# Patient Record
Sex: FEMALE | Race: WHITE | Employment: UNEMPLOYED | URBAN - NONMETROPOLITAN AREA
[De-identification: names, ages, dates, MRNs, and addresses within clinical notes are randomized per-mention and may not be internally consistent; named-entity substitution may affect disease eponyms.]

---

## 2017-03-15 RX ORDER — ENALAPRIL MALEATE 10 MG/1
TABLET ORAL
Qty: 60 TABLET | Refills: 0 | Status: SHIPPED | OUTPATIENT
Start: 2017-03-15 | End: 2017-03-23 | Stop reason: ALTCHOICE

## 2017-03-16 LAB
ANION GAP SERPL CALCULATED.3IONS-SCNC: 15 MMOL/L
BUN BLDV-MCNC: 13 MG/DL (ref 10–20)
CALCIUM SERPL-MCNC: 9.2 MG/DL (ref 8.7–10.8)
CHLORIDE BLD-SCNC: 106 MMOL/L (ref 95–111)
CHOLESTEROL/HDL RATIO: 3.8
CHOLESTEROL: 132 MG/DL
CO2: 22 MMOL/L (ref 21–32)
CREAT SERPL-MCNC: 1 MG/DL (ref 0.5–1.3)
EGFR AFRICAN AMERICAN: 69
EGFR IF NONAFRICAN AMERICAN: 57
GLUCOSE: 121 MG/DL (ref 70–100)
HDLC SERPL-MCNC: 35 MG/DL (ref 40–60)
LDL CHOLESTEROL CALCULATED: 57 MG/DL
LDL/HDL RATIO: 1.6
POTASSIUM SERPL-SCNC: 3.9 MMOL/L (ref 3.5–5.4)
PROTEIN 24 HOUR URINE: 191 MG/24HR (ref 50–100)
PROTEIN, URINE: 9 MG/DL
SODIUM BLD-SCNC: 139 MMOL/L (ref 134–147)
TRIGL SERPL-MCNC: 202 MG/DL
VLDLC SERPL CALC-MCNC: 40 MG/DL
VOLUME: 2125 ML (ref 500–3500)

## 2017-03-23 ENCOUNTER — OFFICE VISIT (OUTPATIENT)
Dept: NEPHROLOGY | Age: 57
End: 2017-03-23

## 2017-03-23 VITALS
WEIGHT: 223 LBS | RESPIRATION RATE: 18 BRPM | HEART RATE: 64 BPM | DIASTOLIC BLOOD PRESSURE: 82 MMHG | SYSTOLIC BLOOD PRESSURE: 140 MMHG

## 2017-03-23 DIAGNOSIS — N02.8 IGA NEPHROPATHY: ICD-10-CM

## 2017-03-23 DIAGNOSIS — E78.5 DYSLIPIDEMIA: ICD-10-CM

## 2017-03-23 DIAGNOSIS — I10 ESSENTIAL HYPERTENSION: ICD-10-CM

## 2017-03-23 DIAGNOSIS — R80.9 PROTEINURIA: Primary | ICD-10-CM

## 2017-03-23 PROCEDURE — 3017F COLORECTAL CA SCREEN DOC REV: CPT | Performed by: INTERNAL MEDICINE

## 2017-03-23 PROCEDURE — G8484 FLU IMMUNIZE NO ADMIN: HCPCS | Performed by: INTERNAL MEDICINE

## 2017-03-23 PROCEDURE — 1036F TOBACCO NON-USER: CPT | Performed by: INTERNAL MEDICINE

## 2017-03-23 PROCEDURE — 99214 OFFICE O/P EST MOD 30 MIN: CPT | Performed by: INTERNAL MEDICINE

## 2017-03-23 PROCEDURE — 3014F SCREEN MAMMO DOC REV: CPT | Performed by: INTERNAL MEDICINE

## 2017-03-23 PROCEDURE — G8427 DOCREV CUR MEDS BY ELIG CLIN: HCPCS | Performed by: INTERNAL MEDICINE

## 2017-03-23 PROCEDURE — G8421 BMI NOT CALCULATED: HCPCS | Performed by: INTERNAL MEDICINE

## 2017-03-23 RX ORDER — SIMVASTATIN 10 MG
10 TABLET ORAL EVERY EVENING
Qty: 90 TABLET | Refills: 3 | Status: SHIPPED | OUTPATIENT
Start: 2017-03-23 | End: 2018-04-16 | Stop reason: SDUPTHER

## 2017-03-23 RX ORDER — AMLODIPINE BESYLATE 10 MG/1
10 TABLET ORAL DAILY
COMMUNITY

## 2017-03-23 RX ORDER — SIMVASTATIN 20 MG
20 TABLET ORAL NIGHTLY
Qty: 90 TABLET | Refills: 3 | Status: SHIPPED | OUTPATIENT
Start: 2017-03-23 | End: 2017-03-23 | Stop reason: CLARIF

## 2017-03-23 RX ORDER — ENALAPRIL MALEATE 10 MG/1
10 TABLET ORAL
Qty: 180 TABLET | Refills: 3 | Status: SHIPPED | OUTPATIENT
Start: 2017-03-23 | End: 2018-04-16 | Stop reason: SDUPTHER

## 2018-03-15 LAB
ALBUMIN SERPL-MCNC: 4.6 G/DL (ref 3.5–5.2)
ALK PHOSPHATASE: 92 U/L (ref 30–121)
ALT SERPL-CCNC: 38 U/L (ref 5–40)
ANION GAP SERPL CALCULATED.3IONS-SCNC: 14 MEQ/L (ref 10–19)
AST SERPL-CCNC: 31 U/L (ref 9–40)
BILIRUB SERPL-MCNC: 0.7 MG/DL
BILIRUBIN DIRECT: <0.2 MG/DL
BUN BLDV-MCNC: 16 MG/DL (ref 8–23)
CALCIUM SERPL-MCNC: 9.4 MG/DL (ref 8.5–10.5)
CHLORIDE BLD-SCNC: 105 MEQ/L (ref 95–107)
CHOLESTEROL/HDL RATIO: 4.6
CHOLESTEROL: 174 MG/DL
CO2: 24 MEQ/L (ref 19–31)
CREAT SERPL-MCNC: 1 MG/DL (ref 0.6–1.3)
EGFR AFRICAN AMERICAN: 72.4 ML/MIN/1.73 M2
EGFR IF NONAFRICAN AMERICAN: 62.5 ML/MIN/1.73 M2
GLUCOSE: 120 MG/DL (ref 70–99)
HDLC SERPL-MCNC: 38 MG/DL
LDL CHOLESTEROL CALCULATED: 92 MG/DL
LDL/HDL RATIO: 2.4
POTASSIUM SERPL-SCNC: 4.2 MEQ/L (ref 3.5–5.4)
PROTEIN 24 HOUR URINE: 169 MG/24HR (ref 0–150)
PROTEIN, URINE: 9 MG/DL
SODIUM BLD-SCNC: 143 MEQ/L (ref 135–146)
TOTAL PROTEIN: 7.4 G/DL (ref 6.1–8.3)
TRIGL SERPL-MCNC: 218 MG/DL
VLDLC SERPL CALC-MCNC: 44 MG/DL
VOLUME: 1875 ML

## 2018-03-22 ENCOUNTER — OFFICE VISIT (OUTPATIENT)
Dept: NEPHROLOGY | Age: 58
End: 2018-03-22
Payer: MEDICARE

## 2018-03-22 VITALS
SYSTOLIC BLOOD PRESSURE: 122 MMHG | WEIGHT: 228.2 LBS | RESPIRATION RATE: 18 BRPM | HEART RATE: 64 BPM | DIASTOLIC BLOOD PRESSURE: 76 MMHG

## 2018-03-22 DIAGNOSIS — R80.0 ISOLATED PROTEINURIA WITHOUT SPECIFIC MORPHOLOGIC LESION: Primary | ICD-10-CM

## 2018-03-22 DIAGNOSIS — N02.8 IGA NEPHROPATHY: ICD-10-CM

## 2018-03-22 DIAGNOSIS — I10 ESSENTIAL HYPERTENSION: ICD-10-CM

## 2018-03-22 DIAGNOSIS — E78.5 DYSLIPIDEMIA: ICD-10-CM

## 2018-03-22 PROCEDURE — 1036F TOBACCO NON-USER: CPT | Performed by: INTERNAL MEDICINE

## 2018-03-22 PROCEDURE — G8421 BMI NOT CALCULATED: HCPCS | Performed by: INTERNAL MEDICINE

## 2018-03-22 PROCEDURE — G8484 FLU IMMUNIZE NO ADMIN: HCPCS | Performed by: INTERNAL MEDICINE

## 2018-03-22 PROCEDURE — 3017F COLORECTAL CA SCREEN DOC REV: CPT | Performed by: INTERNAL MEDICINE

## 2018-03-22 PROCEDURE — G8427 DOCREV CUR MEDS BY ELIG CLIN: HCPCS | Performed by: INTERNAL MEDICINE

## 2018-03-22 PROCEDURE — 3014F SCREEN MAMMO DOC REV: CPT | Performed by: INTERNAL MEDICINE

## 2018-03-22 PROCEDURE — 99213 OFFICE O/P EST LOW 20 MIN: CPT | Performed by: INTERNAL MEDICINE

## 2018-03-22 NOTE — PROGRESS NOTES
tablet by mouth every evening 90 tablet 3    risperiDONE (RISPERDAL) 2 MG tablet       sertraline (ZOLOFT) 100 MG tablet Take 100 mg by mouth daily.  benztropine (COGENTIN) 1 MG tablet Take 1 mg by mouth daily.  fluvoxamine (LUVOX) 100 MG tablet Take 100 mg by mouth nightly. No current facility-administered medications for this visit. Lab Results:    CBC: No results found for: WBC, HGB, HCT, MCV, PLT  BMP:    Lab Results   Component Value Date     03/14/2018     03/15/2017     03/16/2016    K 4.2 03/14/2018    K 3.9 03/15/2017    K 3.9 03/16/2016     03/14/2018     03/15/2017     03/16/2016    CO2 24 03/14/2018    CO2 22 03/15/2017    CO2 24 03/16/2016    BUN 16 03/14/2018    BUN 13 03/15/2017    BUN 14 03/16/2016    CREATININE 1.0 03/14/2018    CREATININE 1.0 03/15/2017    CREATININE 0.9 03/16/2016    GLUCOSE 120 (H) 03/14/2018    GLUCOSE 121 (H) 03/15/2017    GLUCOSE 104 (H) 03/16/2016      Hepatic:   Lab Results   Component Value Date    AST 31 03/14/2018    ALT 38 03/14/2018    BILITOT 0.7 03/14/2018    ALKPHOS 92 03/14/2018     BNP: No results found for: BNP  Lipids:   Lab Results   Component Value Date    CHOL 174 03/14/2018    HDL 38 (L) 03/14/2018     INR: No results found for: INR  URINE:   Lab Results   Component Value Date    PROTUR 9 03/14/2018     No results found for: NITRU, COLORU, PHUR, LABCAST, WBCUA, RBCUA, MUCUS, TRICHOMONAS, YEAST, BACTERIA, CLARITYU, SPECGRAV, LEUKOCYTESUR, UROBILINOGEN, BILIRUBINUR, BLOODU, GLUCOSEU, KETUA, AMORPHOUS   Microalbumen/Creatinine ratio:  No components found for: RUCREAT    Objective:   Vitals: /76   Pulse 64   Resp 18   Wt 228 lb 3.2 oz (103.5 kg)      Constitutional:  Alert, awake, no apparent distress  Skin:normal  HEENT:Pupils are reactive . Throat is clear  Neck:supple with no thyromegally  Cardiovascular:  S1, S2 without m/r/g  Respiratory:  CTA B without w/r/r  Abdomen: +bs, soft, non

## 2018-04-16 RX ORDER — ENALAPRIL MALEATE 10 MG/1
10 TABLET ORAL
Qty: 180 TABLET | Refills: 3 | Status: SHIPPED | OUTPATIENT
Start: 2018-04-16 | End: 2019-03-21 | Stop reason: SDUPTHER

## 2018-04-16 RX ORDER — SIMVASTATIN 10 MG
10 TABLET ORAL EVERY EVENING
Qty: 90 TABLET | Refills: 3 | Status: SHIPPED | OUTPATIENT
Start: 2018-04-16 | End: 2019-04-16 | Stop reason: SDUPTHER

## 2019-03-14 LAB
ALBUMIN SERPL-MCNC: 4.6 G/DL (ref 3.5–5.2)
ALK PHOSPHATASE: 91 U/L (ref 30–121)
ALT SERPL-CCNC: 33 U/L (ref 5–40)
ANION GAP SERPL CALCULATED.3IONS-SCNC: 13 MEQ/L (ref 10–19)
AST SERPL-CCNC: 29 U/L (ref 9–40)
BILIRUB SERPL-MCNC: 0.7 MG/DL
BILIRUBIN DIRECT: <0.2 MG/DL
BUN BLDV-MCNC: 16 MG/DL (ref 8–23)
CALCIUM SERPL-MCNC: 9.3 MG/DL (ref 8.5–10.5)
CHLORIDE BLD-SCNC: 106 MEQ/L (ref 95–107)
CO2: 24 MEQ/L (ref 19–31)
CREAT SERPL-MCNC: 1 MG/DL (ref 0.6–1.3)
EGFR AFRICAN AMERICAN: 71.9 ML/MIN/1.73 M2
EGFR IF NONAFRICAN AMERICAN: 62.1 ML/MIN/1.73 M2
GLUCOSE: 116 MG/DL (ref 70–99)
POTASSIUM SERPL-SCNC: 4.1 MEQ/L (ref 3.5–5.4)
PROTEIN 24 HOUR URINE: 300 MG/24HR (ref 0–150)
PROTEIN, URINE: 10 MG/DL
SODIUM BLD-SCNC: 143 MEQ/L (ref 135–146)
TOTAL PROTEIN: 7.3 G/DL (ref 6.1–8.3)
VOLUME: 3000 ML

## 2019-03-21 ENCOUNTER — OFFICE VISIT (OUTPATIENT)
Dept: NEPHROLOGY | Age: 59
End: 2019-03-21
Payer: MEDICARE

## 2019-03-21 VITALS
WEIGHT: 219.2 LBS | DIASTOLIC BLOOD PRESSURE: 79 MMHG | SYSTOLIC BLOOD PRESSURE: 124 MMHG | HEART RATE: 79 BPM | OXYGEN SATURATION: 100 %

## 2019-03-21 DIAGNOSIS — R80.0 ISOLATED PROTEINURIA WITHOUT SPECIFIC MORPHOLOGIC LESION: Primary | ICD-10-CM

## 2019-03-21 DIAGNOSIS — N02.8 IGA NEPHROPATHY: ICD-10-CM

## 2019-03-21 DIAGNOSIS — E78.5 DYSLIPIDEMIA: ICD-10-CM

## 2019-03-21 DIAGNOSIS — I10 ESSENTIAL HYPERTENSION: ICD-10-CM

## 2019-03-21 PROCEDURE — G8421 BMI NOT CALCULATED: HCPCS | Performed by: INTERNAL MEDICINE

## 2019-03-21 PROCEDURE — G8484 FLU IMMUNIZE NO ADMIN: HCPCS | Performed by: INTERNAL MEDICINE

## 2019-03-21 PROCEDURE — G8427 DOCREV CUR MEDS BY ELIG CLIN: HCPCS | Performed by: INTERNAL MEDICINE

## 2019-03-21 PROCEDURE — 3017F COLORECTAL CA SCREEN DOC REV: CPT | Performed by: INTERNAL MEDICINE

## 2019-03-21 PROCEDURE — 99214 OFFICE O/P EST MOD 30 MIN: CPT | Performed by: INTERNAL MEDICINE

## 2019-03-21 PROCEDURE — 1036F TOBACCO NON-USER: CPT | Performed by: INTERNAL MEDICINE

## 2019-03-21 RX ORDER — ENALAPRIL MALEATE 10 MG/1
10 TABLET ORAL
Qty: 180 TABLET | Refills: 3 | Status: SHIPPED | OUTPATIENT
Start: 2019-03-21 | End: 2020-04-03 | Stop reason: SDUPTHER

## 2019-04-16 RX ORDER — SIMVASTATIN 10 MG
TABLET ORAL
Qty: 90 TABLET | Refills: 3 | Status: SHIPPED | OUTPATIENT
Start: 2019-04-16 | End: 2020-04-09 | Stop reason: SDUPTHER

## 2020-03-25 PROBLEM — E78.5 HLD (HYPERLIPIDEMIA): Status: RESOLVED | Noted: 2020-03-25 | Resolved: 2020-03-24

## 2020-04-03 RX ORDER — ENALAPRIL MALEATE 10 MG/1
10 TABLET ORAL
Qty: 180 TABLET | Refills: 3 | Status: SHIPPED | OUTPATIENT
Start: 2020-04-03

## 2020-04-09 RX ORDER — SIMVASTATIN 10 MG
TABLET ORAL
Qty: 90 TABLET | Refills: 3 | Status: SHIPPED | OUTPATIENT
Start: 2020-04-09 | End: 2021-04-12 | Stop reason: SDUPTHER

## 2020-09-17 LAB
ANION GAP SERPL CALCULATED.3IONS-SCNC: 11 MMOL/L (ref 5–15)
BUN BLDV-MCNC: 17 MG/DL (ref 5–23)
CALCIUM SERPL-MCNC: 9 MG/DL (ref 8.5–10.5)
CHLORIDE BLD-SCNC: 105 MMOL/L (ref 98–109)
CHOLESTEROL/HDL RATIO: 3.7 (ref 1–5)
CHOLESTEROL: 142 MG/DL (ref 150–200)
CO2: 25 MMOL/L (ref 22–32)
CREAT SERPL-MCNC: 0.98 MG/DL (ref 0.4–1)
EGFR AFRICAN AMERICAN: >60 ML/MIN/1.73SQ.M
EGFR IF NONAFRICAN AMERICAN: 58 ML/MIN/1.73SQ.M
GLUCOSE: 97 MG/DL (ref 65–99)
HDLC SERPL-MCNC: 38 MG/DL
LDL CHOLESTEROL CALCULATED: 74 MG/DL
LDL/HDL RATIO: 1.9
POTASSIUM SERPL-SCNC: 3.7 MMOL/L (ref 3.5–5)
PROTEIN 24 HOUR URINE: 148 MG/24H (ref 0–150)
PROTEIN, URINE: 50 MG/L
SODIUM BLD-SCNC: 141 MMOL/L (ref 134–146)
TRIGL SERPL-MCNC: 150 MG/DL (ref 27–150)
VLDLC SERPL CALC-MCNC: 30 MG/DL (ref 0–30)
VOLUME: 2950 ML

## 2020-09-24 ENCOUNTER — OFFICE VISIT (OUTPATIENT)
Dept: NEPHROLOGY | Age: 60
End: 2020-09-24
Payer: MEDICARE

## 2020-09-24 VITALS
DIASTOLIC BLOOD PRESSURE: 74 MMHG | HEART RATE: 69 BPM | TEMPERATURE: 97.7 F | WEIGHT: 225.8 LBS | SYSTOLIC BLOOD PRESSURE: 119 MMHG | OXYGEN SATURATION: 97 %

## 2020-09-24 PROCEDURE — G8427 DOCREV CUR MEDS BY ELIG CLIN: HCPCS | Performed by: INTERNAL MEDICINE

## 2020-09-24 PROCEDURE — 1036F TOBACCO NON-USER: CPT | Performed by: INTERNAL MEDICINE

## 2020-09-24 PROCEDURE — 99214 OFFICE O/P EST MOD 30 MIN: CPT | Performed by: INTERNAL MEDICINE

## 2020-09-24 PROCEDURE — 3017F COLORECTAL CA SCREEN DOC REV: CPT | Performed by: INTERNAL MEDICINE

## 2020-09-24 PROCEDURE — G8421 BMI NOT CALCULATED: HCPCS | Performed by: INTERNAL MEDICINE

## 2020-09-24 RX ORDER — RISPERIDONE 0.5 MG/1
0.5 TABLET, FILM COATED ORAL EVERY EVENING
COMMUNITY

## 2020-09-24 RX ORDER — RISPERIDONE 1 MG/1
1 TABLET, FILM COATED ORAL NIGHTLY
COMMUNITY

## 2020-09-24 RX ORDER — PREDNISONE 10 MG/1
TABLET ORAL
COMMUNITY
Start: 2020-09-21 | End: 2022-03-08

## 2020-09-24 NOTE — PROGRESS NOTES
Renal Progress Note    Assessment and Plan:      Diagnosis Orders   1. Isolated proteinuria without specific morphologic lesion     2. Dyslipidemia     3. IgA nephropathy     4. Essential hypertension       PLAN:  I discussed my thoughts at length with the patient. She understood. Lab results discussed with her in detail. I addressed her questions. Serum creatinine is good at 0.9 mg/L. Serum cholesterol is improved from  142 from 174  Triglyceride is improved from 218-to 150. 24-hour urine is improved to 148 from 300 mg. Medications reviewed. No changes. Return visit in 12 months with labs. Patient Active Problem List   Diagnosis    HTN (hypertension)    Hematuria    Proteinuria    Chronic kidney disease    Microalbuminuria    Exogenous obesity    IgA nephropathy    Dyslipidemia       Subjective:   Chief complaint:  Chief Complaint   Patient presents with    Proteinuria    Chronic Kidney Disease     Stage III      HPI:This is a follow up visit for Ms. Peyton Monteiro who is here today for return appointment. I see her for IgA nephropathy and chronic kidney disease. She was last seen in March 2019.  24 urine protein was 300 mg. Today it is 148 mg. Serum creatinine was 1.3 mg/dL. Today it is 0.98 mg/L. Cholesterol was 174. Today it is 142. Doing well with no complaint. She denies any chest pain or shortness of breath. No nausea or vomiting. No fever or chills. No headaches. No diarrhea. She recently contracted  poison ivy and is on prednisone now. It is for 7 days.     ROS:Constitutional: negative  Eyes: negative  Ears, nose, mouth, throat, and face: negative  Respiratory: negative  Cardiovascular: negative  Gastrointestinal: negative  Genitourinary:negative  Integument/breast: negative  Hematologic/lymphatic: negative  Musculoskeletal:negative  Neurological: negative  Behavioral/Psych: negative  Endocrine: negative  Allergic/Immunologic: negative  Medications:     Current Outpatient Medications   Medication Sig Dispense Refill    risperiDONE (RISPERDAL) 0.5 MG tablet Take 0.5 mg by mouth every evening      predniSONE (DELTASONE) 10 MG tablet       risperiDONE (RISPERDAL) 1 MG tablet Take 1 mg by mouth nightly       simvastatin (ZOCOR) 10 MG tablet TAKE ONE (1) TABLET BY MOUTH EVERY DAY IN THE EVENING. 90 tablet 3    enalapril (VASOTEC) 10 MG tablet Take 1 tablet by mouth 2 times daily (before meals) 180 tablet 3    amLODIPine (NORVASC) 10 MG tablet Take 10 mg by mouth daily      sertraline (ZOLOFT) 100 MG tablet Take 100 mg by mouth daily.  benztropine (COGENTIN) 1 MG tablet Take 1 mg by mouth daily.  fluvoxamine (LUVOX) 100 MG tablet Take 100 mg by mouth nightly. No current facility-administered medications for this visit.         Lab Results:    CBC: No results found for: WBC, HGB, HCT, MCV, PLT  BMP:    Lab Results   Component Value Date     09/16/2020     03/13/2019     03/14/2018    K 3.7 09/16/2020    K 4.1 03/13/2019    K 4.2 03/14/2018     09/16/2020     03/13/2019     03/14/2018    CO2 25 09/16/2020    CO2 24 03/13/2019    CO2 24 03/14/2018    BUN 17 09/16/2020    BUN 16 03/13/2019    BUN 16 03/14/2018    CREATININE 0.98 09/16/2020    CREATININE 1.0 03/13/2019    CREATININE 1.0 03/14/2018    GLUCOSE 97 09/16/2020    GLUCOSE 116 (H) 03/13/2019    GLUCOSE 120 (H) 03/14/2018      Hepatic:   Lab Results   Component Value Date    AST 29 03/13/2019    AST 31 03/14/2018    ALT 33 03/13/2019    ALT 38 03/14/2018    BILITOT 0.7 03/13/2019    BILITOT 0.7 03/14/2018    ALKPHOS 91 03/13/2019    ALKPHOS 92 03/14/2018     BNP: No results found for: BNP  Lipids:   Lab Results   Component Value Date    CHOL 142 (L) 09/16/2020    HDL 38 (L) 09/16/2020     INR: No results found for: INR  URINE:   Lab Results   Component Value Date    PROTUR 50 09/16/2020     No results found for: Bg [place], COLORU, PHUR, LABCAST, WBCUA, RBCUA, MUCUS, TRICHOMONAS, YEAST, BACTERIA, CLARITYU, SPECGRAV, LEUKOCYTESUR, UROBILINOGEN, BILIRUBINUR, BLOODU, GLUCOSEU, KETUA, AMORPHOUS   Microalbumen/Creatinine ratio:  No components found for: RUCREAT    Objective:   Vitals: /74 (Site: Right Upper Arm, Position: Sitting, Cuff Size: Large Adult)   Pulse 69   Temp 97.7 °F (36.5 °C)   Wt 225 lb 12.8 oz (102.4 kg)   SpO2 97%      Constitutional:  Alert, awake, no apparent distress  Skin:normal with no rash or any lesions  HEENT:Pupils are reactive . Throat is clear. Oral mucosa is moist.  Neck:supple with no thyromegaly or bruit   Cardiovascular:  S1, S2 without murmur   Respiratory:  Clear to auscultation with no wheezes or rales  Abdomen: +bowel sound, soft, non tender and no bruit  Ext: No LE edema  Musculoskeletal:Intact  Neuro:Alert, awake and oriented with no obvious focal deficit.   Speech is normal.    Electronically signed by Debora Brewer MD on 9/24/2020 at 3:48 PM

## 2021-04-12 RX ORDER — SIMVASTATIN 10 MG
TABLET ORAL
Qty: 90 TABLET | Refills: 3 | Status: SHIPPED | OUTPATIENT
Start: 2021-04-12

## 2021-09-29 DIAGNOSIS — N18.31 CHRONIC KIDNEY DISEASE (CKD) STAGE G3A/A1, MODERATELY DECREASED GLOMERULAR FILTRATION RATE (GFR) BETWEEN 45-59 ML/MIN/1.73 SQUARE METER AND ALBUMINURIA CREATININE RATIO LESS THAN 30 MG/G (HCC): ICD-10-CM

## 2021-09-29 DIAGNOSIS — E78.5 DYSLIPIDEMIA: ICD-10-CM

## 2021-09-29 DIAGNOSIS — R80.9 PROTEINURIA, UNSPECIFIED TYPE: Primary | ICD-10-CM

## 2022-03-02 LAB
ALBUMIN SERPL-MCNC: 4.3 G/DL (ref 3.2–5.3)
ALK PHOSPHATASE: 86 U/L (ref 39–130)
ALT SERPL-CCNC: 20 U/L (ref 0–31)
ANION GAP SERPL CALCULATED.3IONS-SCNC: 9 MMOL/L (ref 5–15)
AST SERPL-CCNC: 18 U/L (ref 0–41)
BILIRUB SERPL-MCNC: 0.6 MG/DL (ref 0.3–1.2)
BILIRUBIN DIRECT: 0 MG/DL (ref 0–0.4)
BUN BLDV-MCNC: 15 MG/DL (ref 5–27)
CALCIUM SERPL-MCNC: 9.2 MG/DL (ref 8.5–10.5)
CHLORIDE BLD-SCNC: 106 MMOL/L (ref 98–109)
CHOLESTEROL/HDL RATIO: 3.7 (ref 1–5)
CHOLESTEROL: 139 MG/DL (ref 150–200)
CO2: 27 MMOL/L (ref 22–32)
CREAT SERPL-MCNC: 0.87 MG/DL (ref 0.4–1)
EGFR AFRICAN AMERICAN: >60 ML/MIN/1.73SQ.M
EGFR IF NONAFRICAN AMERICAN: >60 ML/MIN/1.73SQ.M
GLUCOSE: 96 MG/DL (ref 65–99)
HDLC SERPL-MCNC: 38 MG/DL
LDL CHOLESTEROL CALCULATED: 58 MG/DL
LDL/HDL RATIO: 1.5
POTASSIUM SERPL-SCNC: 4 MMOL/L (ref 3.5–5)
PROTEIN 24 HOUR URINE: <105 MG/24H (ref 0–150)
PROTEIN, URINE: <40 MG/L
SODIUM BLD-SCNC: 142 MMOL/L (ref 134–146)
TOTAL PROTEIN: 7.1 G/DL (ref 6–8)
TRIGL SERPL-MCNC: 214 MG/DL (ref 27–150)
VLDLC SERPL CALC-MCNC: 43 MG/DL (ref 0–30)
VOLUME: 2625 ML

## 2022-03-08 ENCOUNTER — OFFICE VISIT (OUTPATIENT)
Dept: NEPHROLOGY | Age: 62
End: 2022-03-08
Payer: MEDICARE

## 2022-03-08 VITALS
HEART RATE: 59 BPM | WEIGHT: 195.2 LBS | OXYGEN SATURATION: 99 % | SYSTOLIC BLOOD PRESSURE: 110 MMHG | DIASTOLIC BLOOD PRESSURE: 70 MMHG

## 2022-03-08 DIAGNOSIS — N02.8 IGA NEPHROPATHY: ICD-10-CM

## 2022-03-08 DIAGNOSIS — E78.5 DYSLIPIDEMIA: ICD-10-CM

## 2022-03-08 DIAGNOSIS — N18.31 CHRONIC KIDNEY DISEASE (CKD) STAGE G3A/A1, MODERATELY DECREASED GLOMERULAR FILTRATION RATE (GFR) BETWEEN 45-59 ML/MIN/1.73 SQUARE METER AND ALBUMINURIA CREATININE RATIO LESS THAN 30 MG/G (HCC): ICD-10-CM

## 2022-03-08 DIAGNOSIS — R80.9 PROTEINURIA, UNSPECIFIED TYPE: Primary | ICD-10-CM

## 2022-03-08 DIAGNOSIS — I10 ESSENTIAL HYPERTENSION: ICD-10-CM

## 2022-03-08 PROCEDURE — G8427 DOCREV CUR MEDS BY ELIG CLIN: HCPCS | Performed by: INTERNAL MEDICINE

## 2022-03-08 PROCEDURE — 99213 OFFICE O/P EST LOW 20 MIN: CPT | Performed by: INTERNAL MEDICINE

## 2022-03-08 PROCEDURE — G8484 FLU IMMUNIZE NO ADMIN: HCPCS | Performed by: INTERNAL MEDICINE

## 2022-03-08 PROCEDURE — 1036F TOBACCO NON-USER: CPT | Performed by: INTERNAL MEDICINE

## 2022-03-08 PROCEDURE — G8421 BMI NOT CALCULATED: HCPCS | Performed by: INTERNAL MEDICINE

## 2022-03-08 PROCEDURE — 3017F COLORECTAL CA SCREEN DOC REV: CPT | Performed by: INTERNAL MEDICINE

## 2022-03-08 RX ORDER — CHLORAL HYDRATE 500 MG
3000 CAPSULE ORAL 3 TIMES DAILY
COMMUNITY

## 2022-03-08 NOTE — PROGRESS NOTES
Renal Progress Note    Assessment and Plan:      Diagnosis Orders   1. Proteinuria, unspecified type     2. IgA nephropathy     3. Essential hypertension     4. Dyslipidemia     5. Chronic kidney disease (CKD) stage G3a/A1, moderately decreased glomerular filtration rate (GFR) between 45-59 mL/min/1.73 square meter and albuminuria creatinine ratio less than 30 mg/g ([facility])               PLAN:  1. Lab results discussed with the patient and family   2. They understood   3. I addressed their questions were  4. We reviewed the lab report with her in epic. 5. Serum creatinine is normal.  6. Urine protein creatinine ratio is normal.  7. Medications reviewed. 8. No changes. 9. We will see her as needed. Patient Active Problem List   Diagnosis    HTN (hypertension)    Hematuria    Proteinuria    Chronic kidney disease    Microalbuminuria    Exogenous obesity    IgA nephropathy    Dyslipidemia           Subjective:   Chief complaint:  Chief Complaint   Patient presents with    Proteinuria      HPI:This is a follow up visit for Ms. Nahun Dueñas who is here today for return appointment. I see her for proteinuria. She was last seen about 18 months ago. Doing well with no complaints. Unfortunately she lost her Dad since last time I saw her. No chest pain. No shortness of breath. No fever chills. ROS:  Pertinent positives stated above in HPI. All other systems were reviewed and were negative. Medications:     Current Outpatient Medications   Medication Sig Dispense Refill    simvastatin (ZOCOR) 10 MG tablet TAKE ONE (1) TABLET BY MOUTH EVERY DAY IN THE EVENING.  90 tablet 3    risperiDONE (RISPERDAL) 0.5 MG tablet Take 0.5 mg by mouth every evening      risperiDONE (RISPERDAL) 1 MG tablet Take 1 mg by mouth nightly       enalapril (VASOTEC) 10 MG tablet Take 1 tablet by mouth 2 times daily (before meals) 180 tablet 3    amLODIPine (NORVASC) 10 MG tablet Take 10 mg by mouth daily      sertraline (ZOLOFT) 100 MG tablet Take 100 mg by mouth daily.  benztropine (COGENTIN) 1 MG tablet Take 1 mg by mouth daily.  fluvoxamine (LUVOX) 100 MG tablet Take 100 mg by mouth nightly. No current facility-administered medications for this visit.        Lab Results:    CBC: No results found for: WBC, HGB, HCT, MCV, PLT  BMP:    Lab Results   Component Value Date     03/01/2022     09/16/2020     03/13/2019    K 4.0 03/01/2022    K 3.7 09/16/2020    K 4.1 03/13/2019     03/01/2022     09/16/2020     03/13/2019    CO2 27 03/01/2022    CO2 25 09/16/2020    CO2 24 03/13/2019    BUN 15 03/01/2022    BUN 17 09/16/2020    BUN 16 03/13/2019    CREATININE 0.87 03/01/2022    CREATININE 0.98 09/16/2020    CREATININE 1.0 03/13/2019    GLUCOSE 96 03/01/2022    GLUCOSE 97 09/16/2020    GLUCOSE 116 (H) 03/13/2019      Hepatic:   Lab Results   Component Value Date    AST 18 03/01/2022    AST 29 03/13/2019    AST 31 03/14/2018    ALT 20 03/01/2022    ALT 33 03/13/2019    ALT 38 03/14/2018    BILITOT 0.6 03/01/2022    BILITOT 0.7 03/13/2019    BILITOT 0.7 03/14/2018    ALKPHOS 86 03/01/2022    ALKPHOS 91 03/13/2019    ALKPHOS 92 03/14/2018     BNP: No results found for: BNP  Lipids:   Lab Results   Component Value Date    CHOL 139 (L) 03/01/2022    HDL 38 (L) 03/01/2022     INR: No results found for: INR  URINE:   Lab Results   Component Value Date    PROTUR <40 03/01/2022     No results found for: NITRU, COLORU, PHUR, LABCAST, WBCUA, RBCUA, MUCUS, TRICHOMONAS, YEAST, BACTERIA, CLARITYU, SPECGRAV, LEUKOCYTESUR, UROBILINOGEN, BILIRUBINUR, BLOODU, GLUCOSEU, KETUA, AMORPHOUS   Microalbumen/Creatinine ratio:  No components found for: RUCREAT    Objective:   Vitals: /70 (Site: Left Upper Arm, Position: Sitting, Cuff Size: Large Adult)   Pulse 59   Wt 195 lb 3.2 oz (88.5 kg)   SpO2 99%      Constitutional:  Alert, awake, no apparent distress  Skin:normal with no rash or any significant lesions  HEENT:Pupils are reactive . Throat is clear. Oral mucosa is moist.  Neck:supple with no thyromegaly, JVD, lymphadenopathy or bruit   Cardiovascular: Regular sinus rhythm without murmur, rubs or gallops   Respiratory:  Clear to auscultation with no wheezes or rales  Abdomen: Good bowel sound, soft, non tender and no bruit  Ext: No LE edema  Musculoskeletal:Intact  Neuro:Alert, awake and oriented with no obvious focal deficit. Speech is normal.    Electronically signed by Alexi Waller MD on 3/8/2022 at 10:57 AM   **This report has been created using voice recognition software. It maycontain minor  errors which are inherent in voice recognition technology. **

## 2024-06-13 ENCOUNTER — HOSPITAL ENCOUNTER (OUTPATIENT)
Age: 64
Setting detail: SPECIMEN
Discharge: HOME | End: 2024-06-13
Payer: MEDICARE

## 2024-06-13 LAB
ALBUMIN SERPL BCG-MCNC: 2.5 G/DL (ref 3.5–5.1)
ALP SERPL-CCNC: 207 U/L (ref 38–126)
ALT SERPL W/O P-5'-P-CCNC: 10 U/L (ref 11–66)
ANION GAP SERPL CALC-SCNC: 13 MEQ/L (ref 8–16)
AST SERPL-CCNC: 15 U/L (ref 5–40)
BASOPHILS ABSOLUTE: 0 THOU/MM3 (ref 0–0.1)
BASOPHILS NFR BLD AUTO: 0.4 %
BILIRUB SERPL-MCNC: 0.4 MG/DL (ref 0.3–1.2)
BUN SERPL-MCNC: 17 MG/DL (ref 7–22)
CALCIUM SERPL-MCNC: 8.3 MG/DL (ref 8.5–10.5)
CHLORIDE SERPL-SCNC: 99 MEQ/L (ref 98–111)
CO2 SERPL-SCNC: 18 MEQ/L (ref 23–33)
CREAT SERPL-MCNC: 0.5 MG/DL (ref 0.4–1.2)
DEPRECATED RDW RBC AUTO: 63.1 FL (ref 35–45)
EOSINOPHIL NFR BLD AUTO: 0.4 %
EOSINOPHILS ABSOLUTE: 0 THOU/MM3 (ref 0–0.4)
ERYTHROCYTE [DISTWIDTH] IN BLOOD BY AUTOMATED COUNT: 17.1 % (ref 11.5–14.5)
GFR SERPL CREATININE-BSD FRML MDRD: > 90 ML/MIN/1.73M2
GLUCOSE SERPL-MCNC: 197 MG/DL (ref 70–108)
HCT VFR BLD AUTO: 34.1 % (ref 37–47)
HGB BLD-MCNC: 10.9 GM/DL (ref 12–16)
IMM GRANULOCYTES # BLD AUTO: 0.05 THOU/MM3 (ref 0–0.07)
IMM GRANULOCYTES NFR BLD AUTO: 0.9 %
LYMPHOCYTES ABSOLUTE: 0.1 THOU/MM3 (ref 1–4.8)
LYMPHOCYTES NFR BLD AUTO: 2.3 %
MAGNESIUM SERPL-MCNC: 1.8 MG/DL (ref 1.6–2.4)
MCH RBC QN AUTO: 32.2 PG (ref 26–33)
MCHC RBC AUTO-ENTMCNC: 32 GM/DL (ref 32.2–35.5)
MCV RBC AUTO: 100.9 FL (ref 81–99)
MONOCYTES ABSOLUTE: 0.3 THOU/MM3 (ref 0.4–1.3)
MONOCYTES NFR BLD AUTO: 4.8 %
NEUTROPHILS ABSOLUTE: 5.1 THOU/MM3 (ref 1.8–7.7)
NEUTROPHILS NFR BLD AUTO: 91.2 %
NRBC BLD AUTO-RTO: 0 /100 WBC
PLATELET # BLD AUTO: 145 THOU/MM3 (ref 130–400)
PMV BLD AUTO: 8.9 FL (ref 9.4–12.4)
POTASSIUM SERPL-SCNC: 3.9 MEQ/L (ref 3.5–5.2)
PROT SERPL-MCNC: 6.1 G/DL (ref 6.1–8)
RBC # BLD AUTO: 3.38 MILL/MM3 (ref 4.2–5.4)
SODIUM SERPL-SCNC: 130 MEQ/L (ref 135–145)
WBC # BLD AUTO: 5.6 THOU/MM3 (ref 4.8–10.8)

## 2024-06-13 PROCEDURE — 85025 COMPLETE CBC W/AUTO DIFF WBC: CPT

## 2024-06-13 PROCEDURE — 83735 ASSAY OF MAGNESIUM: CPT

## 2024-06-13 PROCEDURE — 36415 COLL VENOUS BLD VENIPUNCTURE: CPT

## 2024-06-13 PROCEDURE — 80053 COMPREHEN METABOLIC PANEL: CPT
